# Patient Record
Sex: MALE | Race: BLACK OR AFRICAN AMERICAN | NOT HISPANIC OR LATINO | Employment: STUDENT | URBAN - METROPOLITAN AREA
[De-identification: names, ages, dates, MRNs, and addresses within clinical notes are randomized per-mention and may not be internally consistent; named-entity substitution may affect disease eponyms.]

---

## 2023-10-06 ENCOUNTER — HOSPITAL ENCOUNTER (EMERGENCY)
Facility: CLINIC | Age: 23
Discharge: HOME | End: 2023-10-06
Attending: EMERGENCY MEDICINE | Admitting: EMERGENCY MEDICINE
Payer: COMMERCIAL

## 2023-10-06 ENCOUNTER — APPOINTMENT (OUTPATIENT)
Dept: CT IMAGING | Facility: CLINIC | Age: 23
End: 2023-10-06
Attending: EMERGENCY MEDICINE
Payer: COMMERCIAL

## 2023-10-06 VITALS
DIASTOLIC BLOOD PRESSURE: 57 MMHG | SYSTOLIC BLOOD PRESSURE: 105 MMHG | HEART RATE: 70 BPM | OXYGEN SATURATION: 97 % | TEMPERATURE: 98.2 F | RESPIRATION RATE: 16 BRPM

## 2023-10-06 DIAGNOSIS — S02.2XXA CLOSED FRACTURE OF NASAL BONE, INITIAL ENCOUNTER: Primary | ICD-10-CM

## 2023-10-06 DIAGNOSIS — R56.9 SEIZURE (H): ICD-10-CM

## 2023-10-06 LAB
ANION GAP SERPL CALCULATED.3IONS-SCNC: 11 MMOL/L (ref 7–15)
BASO+EOS+MONOS # BLD AUTO: ABNORMAL 10*3/UL
BASO+EOS+MONOS NFR BLD AUTO: ABNORMAL %
BASOPHILS # BLD AUTO: 0 10E3/UL (ref 0–0.2)
BASOPHILS NFR BLD AUTO: 0 %
BUN SERPL-MCNC: 13.6 MG/DL (ref 6–20)
CALCIUM SERPL-MCNC: 9.4 MG/DL (ref 8.6–10)
CHLORIDE SERPL-SCNC: 105 MMOL/L (ref 98–107)
CREAT SERPL-MCNC: 0.78 MG/DL (ref 0.67–1.17)
DEPRECATED HCO3 PLAS-SCNC: 25 MMOL/L (ref 22–29)
EGFRCR SERPLBLD CKD-EPI 2021: >90 ML/MIN/1.73M2
EOSINOPHIL # BLD AUTO: 0.1 10E3/UL (ref 0–0.7)
EOSINOPHIL NFR BLD AUTO: 1 %
ERYTHROCYTE [DISTWIDTH] IN BLOOD BY AUTOMATED COUNT: 12.7 % (ref 10–15)
GLUCOSE SERPL-MCNC: 122 MG/DL (ref 70–99)
HCT VFR BLD AUTO: 37.6 % (ref 40–53)
HGB BLD-MCNC: 12.4 G/DL (ref 13.3–17.7)
IMM GRANULOCYTES # BLD: 0 10E3/UL
IMM GRANULOCYTES NFR BLD: 0 %
LYMPHOCYTES # BLD AUTO: 1.8 10E3/UL (ref 0.8–5.3)
LYMPHOCYTES NFR BLD AUTO: 18 %
MCH RBC QN AUTO: 29.5 PG (ref 26.5–33)
MCHC RBC AUTO-ENTMCNC: 33 G/DL (ref 31.5–36.5)
MCV RBC AUTO: 89 FL (ref 78–100)
MONOCYTES # BLD AUTO: 0.6 10E3/UL (ref 0–1.3)
MONOCYTES NFR BLD AUTO: 6 %
NEUTROPHILS # BLD AUTO: 7.3 10E3/UL (ref 1.6–8.3)
NEUTROPHILS NFR BLD AUTO: 75 %
NRBC # BLD AUTO: 0 10E3/UL
NRBC BLD AUTO-RTO: 0 /100
PLATELET # BLD AUTO: 294 10E3/UL (ref 150–450)
POTASSIUM SERPL-SCNC: 4 MMOL/L (ref 3.4–5.3)
RBC # BLD AUTO: 4.21 10E6/UL (ref 4.4–5.9)
SODIUM SERPL-SCNC: 141 MMOL/L (ref 135–145)
WBC # BLD AUTO: 9.7 10E3/UL (ref 4–11)

## 2023-10-06 PROCEDURE — 36415 COLL VENOUS BLD VENIPUNCTURE: CPT | Performed by: EMERGENCY MEDICINE

## 2023-10-06 PROCEDURE — 99285 EMERGENCY DEPT VISIT HI MDM: CPT | Mod: 25 | Performed by: EMERGENCY MEDICINE

## 2023-10-06 PROCEDURE — 96366 THER/PROPH/DIAG IV INF ADDON: CPT | Performed by: EMERGENCY MEDICINE

## 2023-10-06 PROCEDURE — 70450 CT HEAD/BRAIN W/O DYE: CPT | Mod: 26 | Performed by: RADIOLOGY

## 2023-10-06 PROCEDURE — 96365 THER/PROPH/DIAG IV INF INIT: CPT | Performed by: EMERGENCY MEDICINE

## 2023-10-06 PROCEDURE — 80048 BASIC METABOLIC PNL TOTAL CA: CPT | Performed by: EMERGENCY MEDICINE

## 2023-10-06 PROCEDURE — 250N000011 HC RX IP 250 OP 636: Mod: JZ | Performed by: EMERGENCY MEDICINE

## 2023-10-06 PROCEDURE — 85004 AUTOMATED DIFF WBC COUNT: CPT | Performed by: EMERGENCY MEDICINE

## 2023-10-06 PROCEDURE — 70486 CT MAXILLOFACIAL W/O DYE: CPT | Mod: 26 | Performed by: RADIOLOGY

## 2023-10-06 PROCEDURE — 93005 ELECTROCARDIOGRAM TRACING: CPT | Mod: RTG

## 2023-10-06 PROCEDURE — 70450 CT HEAD/BRAIN W/O DYE: CPT

## 2023-10-06 PROCEDURE — 99284 EMERGENCY DEPT VISIT MOD MDM: CPT | Performed by: EMERGENCY MEDICINE

## 2023-10-06 PROCEDURE — 258N000003 HC RX IP 258 OP 636: Performed by: EMERGENCY MEDICINE

## 2023-10-06 PROCEDURE — 70486 CT MAXILLOFACIAL W/O DYE: CPT

## 2023-10-06 RX ADMIN — LEVETIRACETAM 2000 MG: 100 INJECTION, SOLUTION INTRAVENOUS at 16:38

## 2023-10-06 ASSESSMENT — ACTIVITIES OF DAILY LIVING (ADL)
ADLS_ACUITY_SCORE: 35

## 2023-10-06 NOTE — ED TRIAGE NOTES
"BIBA Pt was at work  had a seizure and feel 2-3 feet off platform he was working on. Pt does not remember anything other than waking up in the ambulance.   Pt has a history of seizures last one about 2 months ago, per pt \"compliant with meds\".     Pre hospital blood sugar 102        "
18

## 2023-10-06 NOTE — ED PROVIDER NOTES
[place] EMERGENCY DEPARTMENT ([facility])    10/06/23       ED PROVIDER NOTE  ED 17      History     Chief Complaint   Patient presents with    Seizures    Fall     HPI  Mehdi Daugherty is a 22 year old male with a past medical history significant for seizures, on Keppra with medication noncompliance, and substance abuse who presents to the Emergency Department for evaluation after he sustained a witnessed seizure at work.  He was standing on a platform approximately 3 feet high and he started having tonic-clonic movements per his coworkers and he fell off the platform continuing to seize.  Seizure subsided prior to EMS arrival.  He did not receive any medications by EMS prior to arriving to the emergency department.  He is complaining of mild diffuse headache.  No recent fevers or illnesses.  He claims he is compliant with his medications, however, his mother denies this.  He denies any other medical problems.      Past Medical History  Past Medical History:   Diagnosis Date    Personal history of other medical treatment (CODE)     No Comments Provided     Past Surgical History:   Procedure Laterality Date    OTHER SURGICAL HISTORY      [id number],NO PREVIOUS SURGERY     No current outpatient medications on file.    Allergies   Allergen Reactions    Pcn [Penicillins] Rash     Family History  Family History   Problem Relation Age of Onset    Other - See Comments Paternal Grandfather         brain tumor    Diabetes Maternal Grandfather         Diabetes    Diabetes Maternal Grandmother         Diabetes    Hypertension Maternal Grandfather         Hypertension    Hyperlipidemia Maternal Grandfather         Hyperlipidemia    Unknown/Adopted Maternal Grandmother         Unknown,fibromyalgia, KEVIN    Cancer Other         Cancer,esophageal with mets    Asthma Father         Asthma     Social History   Social History     Tobacco Use    Smoking status: Never    Smokeless tobacco: Never    Tobacco comments:      Quit smoking: nonsmoking home   Substance Use Topics    Alcohol use: Yes    Drug use: Yes     Types: Other, Marijuana     Comment: Drug use: Not Asked      Past medical history, past surgical history, medications, allergies, family history, and social history were reviewed with the patient. No additional pertinent items.      A medically appropriate review of systems was performed with pertinent positives and negatives noted in the HPI, and all other systems negative.    Physical Exam      Physical Exam  Vitals and nursing note reviewed.   Constitutional:       General: He is not in acute distress.     Appearance: He is not ill-appearing, toxic-appearing or diaphoretic.      Comments: Patient is awake, alert, in no acute distress.   HENT:      Head: Normocephalic.      Comments: Ecchymosis and tenderness with mild edema over the nasal bridge.  There is also ecchymosis of the left cheek.     Nose:      Comments: Ecchymosis and tenderness with mild edema over the nasal bridge. No active bleeding from the nose.  No septal hematoma.  There is some dried blood around the left naris.     Mouth/Throat:      Comments: Tongue abrasion on the right side without a laceration.  There is an abrasion of the left lower lip without bleeding or laceration.  Eyes:      General: No scleral icterus.     Extraocular Movements: Extraocular movements intact.      Conjunctiva/sclera: Conjunctivae normal.      Pupils: Pupils are equal, round, and reactive to light.   Cardiovascular:      Rate and Rhythm: Normal rate.      Heart sounds: Normal heart sounds.   Pulmonary:      Effort: Pulmonary effort is normal. No respiratory distress.      Breath sounds: Normal breath sounds.   Abdominal:      General: Abdomen is flat.      Palpations: Abdomen is soft.      Tenderness: There is no abdominal tenderness.   Musculoskeletal:         General: No swelling, tenderness or deformity. Normal range of motion.      Cervical back: Full passive range of  motion without pain, normal range of motion and neck supple. No tenderness. No pain with movement or spinous process tenderness. Normal range of motion.   Skin:     General: Skin is warm.      Findings: No rash.   Neurological:      General: No focal deficit present.      Mental Status: He is alert and oriented to person, place, and time.      GCS: GCS eye subscore is 4. GCS verbal subscore is 5. GCS motor subscore is 6.      Cranial Nerves: Cranial nerves 2-12 are intact. No cranial nerve deficit.      Sensory: Sensation is intact.      Motor: Motor function is intact.      Coordination: Coordination is intact. Coordination normal.   Psychiatric:         Mood and Affect: Mood normal.         Behavior: Behavior normal.           ED Course, Procedures, & Data      Procedures            EKG Interpretation:      Interpreted by Shaina Haq MD  Time reviewed: 4:08 PM  Symptoms at time of EKG: Seizure versus syncope  Rhythm: normal sinus   Rate: normal  Axis: normal  Ectopy: none  Conduction: normal  ST Segments/ T Waves: No ST-T wave changes  Q Waves: none  Comparison to prior: No old EKG available    Clinical Impression: normal EKG                 Results for orders placed or performed during the hospital encounter of 10/06/23   EKG 12-lead, tracing only     Status: None (Preliminary result)   Result Value Ref Range    Systolic Blood Pressure  mmHg    Diastolic Blood Pressure  mmHg    Ventricular Rate 98 BPM    Atrial Rate 98 BPM    CA Interval 138 ms    QRS Duration 82 ms     ms    QTc 446 ms    P Axis 68 degrees    R AXIS 82 degrees    T Axis 73 degrees    Interpretation ECG Sinus rhythm  Normal ECG      CBC with platelets differential     Status: None ()    Narrative    The following orders were created for panel order CBC with platelets differential.  Procedure                               Abnormality         Status                     ---------                               -----------         ------                      CBC with platelets and d...[[id number]]                                                   Please view results for these tests on the individual orders.     Medications   levETIRAcetam (KEPPRA) 2,000 mg in sodium chloride 0.9 % 250 mL intermittent infusion (has no administration in time range)     Labs Ordered and Resulted from Time of ED Arrival to Time of ED Departure - No data to display  CT Head w/o Contrast    (Results Pending)          Critical care was not performed.     Medical Decision Making  The patient's presentation was of high complexity (a chronic illness severe exacerbation, progression, or side effect of treatment).    The patient's evaluation involved:  review of external note(s) from 1 sources (see separate area of note for details)  review of 1 test result(s) ordered prior to this encounter (see separate area of note for details)  ordering and/or review of 3+ test(s) in this encounter (see separate area of note for details)    The patient's management necessitated further care after sign-out to partner awaiting completion of the work-up and disposition (see their note for further management).    Assessment & Plan    22-year-old man presenting with a seizure and head trauma.  Differential diagnosis: Breakthrough seizure, medication noncompliance, electro abnormalities, ICH, facial fractures, abrasions.    After thorough history and physical exam patient appears to be in no acute distress.  I will obtain CT of the head and maxillofacial bones for further diagnostic evaluation along with laboratory studies.  I will load the patient with 2 g of IV Keppra.    I have reviewed the nursing notes. I have reviewed the findings, diagnosis, plan and need for follow up with the patient.    New Prescriptions    No medications on file       Final diagnoses:   None       Shaina Haq MD  [facility] EMERGENCY DEPARTMENT  10/6/2023     Shaina Haq MD  10/06/23 1160

## 2023-10-06 NOTE — DISCHARGE INSTRUCTIONS
CT scan of your head is reassuring.  CT scan of your face shows a nasal bone fracture.  There is no need for intervention at this time, however, you will need to follow-up with the ear/nose/throat team in their office in the next week.  Consultation order has been placed and you should be contacted to schedule follow-up appointment.  Their contact information is listed below as well.  Take ibuprofen/acetaminophen for pain.  Ice to the nose/face for 10 to 15 minutes at a time should be helpful as well.  Do not use a straw, blow your nose, or smoke.  Try to limit coughing/sneezing.      Take your seizure medications as previously prescribed.    [facility] will call you to coordinate your care as prescribed by the provider. If you don t hear from a representative within 2 business days, please call [phone].

## 2023-10-07 LAB
ATRIAL RATE - MUSE: 98 BPM
DIASTOLIC BLOOD PRESSURE - MUSE: NORMAL MMHG
INTERPRETATION ECG - MUSE: NORMAL
P AXIS - MUSE: 68 DEGREES
PR INTERVAL - MUSE: 138 MS
QRS DURATION - MUSE: 82 MS
QT - MUSE: 350 MS
QTC - MUSE: 446 MS
R AXIS - MUSE: 82 DEGREES
SYSTOLIC BLOOD PRESSURE - MUSE: NORMAL MMHG
T AXIS - MUSE: 73 DEGREES
VENTRICULAR RATE- MUSE: 98 BPM

## 2023-10-09 ENCOUNTER — TELEPHONE (OUTPATIENT)
Dept: OTOLARYNGOLOGY | Facility: CLINIC | Age: 23
End: 2023-10-09
Payer: COMMERCIAL

## 2023-10-09 NOTE — TELEPHONE ENCOUNTER
[facility] Call Center    Phone Message    May a detailed message be left on voicemail: yes     Reason for Call: Appointment Intake    Referring Provider Name: Fred Maravilla DO  Diagnosis and/or Symptoms: Closed fracture of nasal bone, initial encounter [S02.2XXA]    Please review     Action Taken: Message routed to:  [facility] ENT    Travel Screening: Not Applicable

## 2023-10-09 NOTE — TELEPHONE ENCOUNTER
LVM to schedule pt with provider who sees for nasal fractures with in 7 days. Ask pt's preferred location and if no appts then route to that clinic's team.     Gave call center number

## 2023-10-10 NOTE — TELEPHONE ENCOUNTER
FUTURE VISIT INFORMATION      FUTURE VISIT INFORMATION:  Date: 11/8/23  Time: 7am   [place] [facility] [place] INFORMATION:  Referring provider:  Fred Maravilla DO   Referring providers clinic:  [facility]  Reason for visit/diagnosis  S02.2XXA (ICD-10-CM) - Closed fracture of nasal bone, initial encounter, Referral from Fred Maravilla, Referral notes in EPIC, Pt made appt for [facility] location     RECORDS REQUESTED FROM:       Clinic name Comments Records Status Imaging Status   [facility] 10/6/23- OV Fred Bashir DO  [facility]     IMAGING  CT HEAD, CT FACIAL BONES- 10/6/23 [facility]  PACS

## 2023-11-08 ENCOUNTER — PRE VISIT (OUTPATIENT)
Dept: OTOLARYNGOLOGY | Facility: CLINIC | Age: 23
End: 2023-11-08